# Patient Record
Sex: FEMALE | Race: WHITE | NOT HISPANIC OR LATINO | Employment: OTHER | URBAN - METROPOLITAN AREA
[De-identification: names, ages, dates, MRNs, and addresses within clinical notes are randomized per-mention and may not be internally consistent; named-entity substitution may affect disease eponyms.]

---

## 2021-06-24 ENCOUNTER — APPOINTMENT (OUTPATIENT)
Dept: RADIOLOGY | Facility: MEDICAL CENTER | Age: 61
End: 2021-06-24
Attending: EMERGENCY MEDICINE
Payer: COMMERCIAL

## 2021-06-24 ENCOUNTER — HOSPITAL ENCOUNTER (EMERGENCY)
Facility: MEDICAL CENTER | Age: 61
End: 2021-06-24
Attending: EMERGENCY MEDICINE
Payer: COMMERCIAL

## 2021-06-24 VITALS
OXYGEN SATURATION: 95 % | HEART RATE: 71 BPM | TEMPERATURE: 97 F | HEIGHT: 64 IN | BODY MASS INDEX: 24.5 KG/M2 | DIASTOLIC BLOOD PRESSURE: 71 MMHG | WEIGHT: 143.52 LBS | SYSTOLIC BLOOD PRESSURE: 151 MMHG | RESPIRATION RATE: 18 BRPM

## 2021-06-24 DIAGNOSIS — R09.89 CHOKING EPISODE: ICD-10-CM

## 2021-06-24 DIAGNOSIS — E87.0 HYPERNATREMIA: ICD-10-CM

## 2021-06-24 LAB
ANION GAP SERPL CALC-SCNC: 11 MMOL/L (ref 7–16)
BASOPHILS # BLD AUTO: 0.6 % (ref 0–1.8)
BASOPHILS # BLD: 0.04 K/UL (ref 0–0.12)
BUN SERPL-MCNC: 20 MG/DL (ref 8–22)
CALCIUM SERPL-MCNC: 9.6 MG/DL (ref 8.5–10.5)
CHLORIDE SERPL-SCNC: 113 MMOL/L (ref 96–112)
CO2 SERPL-SCNC: 25 MMOL/L (ref 20–33)
CREAT SERPL-MCNC: 0.89 MG/DL (ref 0.5–1.4)
EOSINOPHIL # BLD AUTO: 0.2 K/UL (ref 0–0.51)
EOSINOPHIL NFR BLD: 3.2 % (ref 0–6.9)
ERYTHROCYTE [DISTWIDTH] IN BLOOD BY AUTOMATED COUNT: 46.2 FL (ref 35.9–50)
GLUCOSE SERPL-MCNC: 97 MG/DL (ref 65–99)
HCT VFR BLD AUTO: 42.2 % (ref 37–47)
HGB BLD-MCNC: 13.8 G/DL (ref 12–16)
IMM GRANULOCYTES # BLD AUTO: 0.01 K/UL (ref 0–0.11)
IMM GRANULOCYTES NFR BLD AUTO: 0.2 % (ref 0–0.9)
LYMPHOCYTES # BLD AUTO: 1.58 K/UL (ref 1–4.8)
LYMPHOCYTES NFR BLD: 24.9 % (ref 22–41)
MCH RBC QN AUTO: 30.4 PG (ref 27–33)
MCHC RBC AUTO-ENTMCNC: 32.7 G/DL (ref 33.6–35)
MCV RBC AUTO: 93 FL (ref 81.4–97.8)
MONOCYTES # BLD AUTO: 0.58 K/UL (ref 0–0.85)
MONOCYTES NFR BLD AUTO: 9.1 % (ref 0–13.4)
NEUTROPHILS # BLD AUTO: 3.93 K/UL (ref 2–7.15)
NEUTROPHILS NFR BLD: 62 % (ref 44–72)
NRBC # BLD AUTO: 0 K/UL
NRBC BLD-RTO: 0 /100 WBC
PLATELET # BLD AUTO: 330 K/UL (ref 164–446)
PMV BLD AUTO: 10.2 FL (ref 9–12.9)
POTASSIUM SERPL-SCNC: 4.4 MMOL/L (ref 3.6–5.5)
RBC # BLD AUTO: 4.54 M/UL (ref 4.2–5.4)
SODIUM SERPL-SCNC: 149 MMOL/L (ref 135–145)
WBC # BLD AUTO: 6.3 K/UL (ref 4.8–10.8)

## 2021-06-24 PROCEDURE — 80048 BASIC METABOLIC PNL TOTAL CA: CPT

## 2021-06-24 PROCEDURE — 700102 HCHG RX REV CODE 250 W/ 637 OVERRIDE(OP): Performed by: EMERGENCY MEDICINE

## 2021-06-24 PROCEDURE — 96374 THER/PROPH/DIAG INJ IV PUSH: CPT

## 2021-06-24 PROCEDURE — 99284 EMERGENCY DEPT VISIT MOD MDM: CPT

## 2021-06-24 PROCEDURE — 36415 COLL VENOUS BLD VENIPUNCTURE: CPT

## 2021-06-24 PROCEDURE — 71045 X-RAY EXAM CHEST 1 VIEW: CPT

## 2021-06-24 PROCEDURE — 96375 TX/PRO/DX INJ NEW DRUG ADDON: CPT

## 2021-06-24 PROCEDURE — A9270 NON-COVERED ITEM OR SERVICE: HCPCS | Performed by: EMERGENCY MEDICINE

## 2021-06-24 PROCEDURE — 85025 COMPLETE CBC W/AUTO DIFF WBC: CPT

## 2021-06-24 PROCEDURE — 700111 HCHG RX REV CODE 636 W/ 250 OVERRIDE (IP): Performed by: EMERGENCY MEDICINE

## 2021-06-24 RX ORDER — LITHIUM CARBONATE 300 MG
300 TABLET ORAL DAILY
COMMUNITY

## 2021-06-24 RX ORDER — LEVOTHYROXINE SODIUM 0.05 MG/1
50 TABLET ORAL
COMMUNITY

## 2021-06-24 RX ORDER — CLONAZEPAM 1 MG/1
1 TABLET ORAL
COMMUNITY

## 2021-06-24 RX ORDER — ONDANSETRON 2 MG/ML
4 INJECTION INTRAMUSCULAR; INTRAVENOUS ONCE
Status: COMPLETED | OUTPATIENT
Start: 2021-06-24 | End: 2021-06-24

## 2021-06-24 RX ADMIN — ONDANSETRON 4 MG: 2 INJECTION INTRAMUSCULAR; INTRAVENOUS at 19:50

## 2021-06-24 RX ADMIN — LIDOCAINE HYDROCHLORIDE 30 ML: 20 SOLUTION OROPHARYNGEAL at 19:49

## 2021-06-24 RX ADMIN — GLUCAGON 1 MG: 1 INJECTION, POWDER, LYOPHILIZED, FOR SOLUTION INTRAMUSCULAR; INTRAVENOUS at 19:54

## 2021-06-24 ASSESSMENT — ENCOUNTER SYMPTOMS: VOMITING: 1

## 2021-06-25 ASSESSMENT — ENCOUNTER SYMPTOMS
ABDOMINAL PAIN: 0
NAUSEA: 1
SHORTNESS OF BREATH: 0

## 2021-06-25 NOTE — ED TRIAGE NOTES
"Pt ambulated to triage with   Chief Complaint   Patient presents with   • Choking     pt reports that she almosted \"choked to death today\" pt significant other reports that he had to do the heimlich manuver.  pt reports that she was eating really quick and was eatting chicken teriyaki and was drinking her milk and than starte to vomit  pt has vomited two other time and unable to keep anything down.  pt speaking in full sentances and handling secreations.        Pt Informed regarding triage process and verbalized understanding to inform triage tech or RN for any changes in condition. Placed in lobby.     "

## 2021-06-25 NOTE — ED PROVIDER NOTES
"ED Provider Note    Scribed for Rut Shannon M.D. by Yariel Griffith. 6/24/2021, 7:13 PM.    Primary care provider: No primary care provider.  Means of arrival: Walk-in  History obtained from: Patient  History limited by: None    CHIEF COMPLAINT  Chief Complaint   Patient presents with   • Choking     pt reports that she almosted \"choked to death today\" pt significant other reports that he had to do the heimlich manuver.  pt reports that she was eating really quick and was eatting chicken teriyaki and was drinking her milk and than starte to vomit  pt has vomited two other time and unable to keep anything down.  pt speaking in full sentances and handling secreations.         HPI  Jorge Alberto Benítez is a 60 y.o. female who presents to the Emergency Department for evaluation following a choking episode that occurred prior to arrival. She states that she was eating chicken and drinking milk quickly when she started choking and \"almost choked to death.\" Her  performed the heimlich on her, and she has vomited several times since.  She reports that she has been unable to hold anything down since.    Additionally, patient reports that she recently changed when she takes her Lithium, and is now taking it in the mornings. She is concerned about her renal function and is requesting labs to have her renal function evaluated.    REVIEW OF SYSTEMS  Review of Systems   Respiratory: Negative for shortness of breath.    Cardiovascular: Negative for chest pain.   Gastrointestinal: Positive for nausea and vomiting. Negative for abdominal pain.        Positive for choking   All other systems reviewed and are negative.      PAST MEDICAL HISTORY   has a past medical history of Bipolar disorder (HCC), Hashimoto's disease, and Renal disorder.    SURGICAL HISTORY   has a past surgical history that includes tubal ligation.    SOCIAL HISTORY  Social History     Tobacco Use   • Smoking status: Former Smoker     Quit date: " "2000     Years since quittin.0   • Smokeless tobacco: Never Used   Substance Use Topics   • Alcohol use: Yes     Comment: occ   • Drug use: Not Currently      Social History     Substance and Sexual Activity   Drug Use Not Currently       FAMILY HISTORY  History reviewed. No pertinent family history.    CURRENT MEDICATIONS  Current Outpatient Medications   Medication Instructions   • clonazePAM (KLONOPIN) 1 mg, Oral, 1 TIME DAILY PRN   • levothyroxine (SYNTHROID) 50 mcg, Oral, EACH MORNING ON EMPTY STOMACH   • lithium carbonate (IR) 300 mg, Oral, DAILY       ALLERGIES  No Known Allergies    PHYSICAL EXAM  VITAL SIGNS: /80   Pulse 73   Temp 36.2 °C (97.1 °F) (Temporal)   Resp 14   Ht 1.626 m (5' 4\")   Wt 65.1 kg (143 lb 8.3 oz)   SpO2 95%   BMI 24.64 kg/m²   Vitals reviewed by myself.  Nursing note and vitals reviewed.  Constitutional: Well-developed and well-nourished. No acute distress.   HENT: Head is normocephalic and atraumatic.  Eyes: extra-ocular movements intact  Cardiovascular: Regular rate and regular rhythm. No murmur heard.  Pulmonary/Chest: Breath sounds normal. No wheezes or rales.   Abdominal: Soft and non-tender. No distention.    Musculoskeletal: Extremities exhibit normal range of motion without edema or tenderness.   Neurological: Awake and alert  Skin: Skin is warm and dry. No rash.       DIAGNOSTIC STUDIES /  LABS  Labs Reviewed   BASIC METABOLIC PANEL - Abnormal; Notable for the following components:       Result Value    Sodium 149 (*)     Chloride 113 (*)     All other components within normal limits   CBC WITH DIFFERENTIAL - Abnormal; Notable for the following components:    MCHC 32.7 (*)     All other components within normal limits   ESTIMATED GFR     All labs reviewed by me.    RADIOLOGY  DX-CHEST-PORTABLE (1 VIEW)   Final Result      No acute cardiopulmonary findings.        The radiologist's interpretation of all radiological studies have been reviewed by " me.    REASSESSMENT    7:13 PM - Patient seen and examined at bedside. Discussed plan of care, including medication, labs, and imaging. Patient agrees to the plan of care. The patient will be medicated with GI cocktail, glucagon, and Zofran. Ordered for labs and imaging to evaluate her symptoms.     8:29 PM - Patient was reevaluated at bedside. She will be PO challenged.    8:47 PM - Patient was reevaluated at bedside. Discussed lab results with the patient and informed them that she has incidental hypernatremia. Advised drinking water. She has tolerated a cup of water without choking or vomiting. Discussed discharge instructions and return precautions with the patient and they were cleared for discharge. Patient was given the opportunity to ask any further questions. She is comfortable with discharge at this time.       COURSE & MEDICAL DECISION MAKING  Nursing notes, VS, PMSFHx reviewed in chart.    Patient is a 60-year-old female who comes in for evaluation of choking episode.  Differential diagnosis includes choking episode, esophageal spasm, mucosal tear, Boerhaave's.  On physical exam patient is well-appearing tolerating secretions without difficulty, does not appear to have food impaction.  Will obtain a chest x-ray for screening of Boerhaave's.  I will also obtain labs at patient's request as she is concerned about renal function given restarting her lithium recently.    Patient's initial vitals notable for slight hypertension, otherwise unremarkable.  She is treated with glucagon, GI cocktail and Zofran after which she feels greatly improved and is able to tolerate oral intake without difficulty.  Labs returned and are unremarkable.  Chest x-ray returns demonstrates no acute cardiopulmonary processes.  As patient is feeling improved and tolerating oral intake I reassured her and gave her strict return precautions.  She is then given strict return precautions and discharged in stable condition.      The  patient will return for new or worsening symptoms and is stable at the time of discharge.    The patient is referred to a primary physician for blood pressure management, diabetic screening, and for all other preventative health concerns.    DISPOSITION:  Patient will be discharged home in stable condition.    FOLLOW UP:  [facility]  [address] [place] [id number]  [phone]            FINAL IMPRESSION  1. Choking episode    2. Hypernatremia          Yariel RESENDIZ (Irmaibjudah), am scribing for, and in the presence of, Rut Shannon M.D..    Electronically signed by: Yariel Griffith (Alan), 6/24/2021    Rut RESENDIZ M.D. personally performed the services described in this documentation, as scribed by Yariel Griffith in my presence, and it is both accurate and complete.    The note accurately reflects work and decisions made by me.  Rut Shannon M.D.  6/25/2021  3:54 AM

## 2021-06-25 NOTE — ED NOTES
"Pt discharged home w/ , pt A&Ox4, steady gait. RA. Pt educated on worsening symptoms, pt verbalized understanding. IV discontinued and gauze placed, pt in possession of belongings. Pt provided discharge education and information pertaining to medications and follow up appointments. Pt received copy of discharge instructions and verbalized understanding. /71   Pulse 71   Temp 36.1 °C (97 °F) (Temporal)   Resp 18   Ht 1.626 m (5' 4\")   Wt 65.1 kg (143 lb 8.3 oz)   SpO2 95%   BMI 24.64 kg/m²   "